# Patient Record
(demographics unavailable — no encounter records)

---

## 2024-12-12 NOTE — HISTORY OF PRESENT ILLNESS
[FreeTextEntry1] : The patient is an 85-year-old male with a history of coronary artery disease (status post CABG 2003 with LIMA to LAD and SVG to OM1; CHRISTAL to native mid-circumflex artery 5/10/2022), paroxysmal atrial fibrillation (newly-discovered 3/22/2023), ventricular ectopy (exhibited during nuclear GXT 4/8/2022), mild left ventricular diastolic dysfunction, a mildly dilated ascending aorta (4.1 cm on CT chest 12/13/2021, 4.0 cm in diameter on cardiac MRI 5/23/2023), chronic dyspnea on exertion, mild mitral regurgitation, moderate tricuspid regurgitation, moderate to severe carotid atherosclerosis, hypertension, a dyslipidemia, pre-diabetes, TYESHA, micturition syncope (2016), peptic ulcer disease (status post upper GI bleeding, for which Plavix was discontinued), a left thyroid nodule, osteoporosis, vitamin D deficiency, lumbar degenerative disc disease, prostate carcinoma (status post seed implantation 4/1999), and left inguinal hernia repair (10/5/2020).  IN early December of 2024 he presented to our office after a syncopal episode after getting out of bed.  HE came to the office, and was severely orthostatic.  He was sent to the ED.  Work up there revealed an elevated BUN/creatinine ratio, but not other abnormalities.  Flomax was held, but has been resumed.  He is now noticing that his BP is elevated.  HE was in AF at that point, and there was some thought that maintenance of sinus rhythm may be helpful.  He is back in sinus today.  He has not had any further episodes.   Other than a mostly high BP, he has no symptoms.

## 2024-12-12 NOTE — DISCUSSION/SUMMARY
[FreeTextEntry1] : This is an 85 year old man with a history as above who comes to the office for follow up after an episode of what seems to be orthostatic or vagal syncope.  He was sent to the ED, and labs indicated that he was dehydrated.  He has been trying to hydrate better, but can still drink more.  He will try.  He was in AF, but is back in NSR.  It does not seem that AF was the cause of his syncope.  His Flomax was held, but he has resumed it.  His BP is mostly high.  I am increasing Toprol to BID dosing.  He will continue the rest of his medications, and follow with DR. Moreno at the end of the month.  Last carotid Doppler and echocaridogram were unrevealing.   [EKG obtained to assist in diagnosis and management of assessed problem(s)] : EKG obtained to assist in diagnosis and management of assessed problem(s)

## 2024-12-30 NOTE — DISCUSSION/SUMMARY
[FreeTextEntry1] : Mr. Gutierrez has a history of coronary artery disease, and was detected as exhibiting newly-discovered paroxysmal atrial fibrillation with a controlled ventricular response at rest (however, with a tachycardic ventricular response upon walking to the bathroom in the emergency room) on 3/22/2023, after having presented with lightheadedness and weakness since early that morning.  Rate control therapy with metoprolol and anticoagulation with Eliquis were initiated at that time for control of the ventricular response and to reduce the risk of stroke, respectively.  The patient developed symptomatic recurrence of paroxysmal atrial fibrillation with a controlled ventricular response on 4/2/2024, with the rhythm noted to have spontaneously converted back to sinus rhythm on a 3-day Holter monitor study initiated on 4/2/2024 (AF burden 1% on this study).  He was noted to be exhibiting recurrence of paroxysmal atrial fibrillation when he presented to the office with symptomatic orthostatic hypotension on 12/3/2024.  He was noted to be exhibiting sinus rhythm at the time of a follow-up visit in our office on 12/12/2024, at which time his dosage of Toprol-XL was increased in an effort to more optimally control his blood pressure.  The patient reports having experienced a 5-minute episode of palpitations without associated symptoms approximately 1 week ago, resolving spontaneously.  He has otherwise not experienced recurrence of palpitations since his previous visit here.  He has noted a similar degree of dyspnea on exertion in association with walking 4 blocks and/or climbing more than 1 flight of stairs.  His cardiac examination today is remarkable for a regular rhythm with a normal rate.  He is not exhibiting evidence of congestive heart failure on examination today.  His blood pressure reading today is normal in a supine position (130/76), with an asymptomatic orthostatic drop upon standing (100/60).  His electrocardiogram today reveals sinus rhythm at a rate of 65 bpm, a nonspecific RSR' pattern associated with T wave inversion in leads V1-V3, and nonspecific diminished voltage in leads V5-V6, essentially unchanged from his previous office tracing, allowing for lead placement variation, with the exception of no atrial premature contractions exhibited on the present tracing.   I pointed out to the patient that he is again exhibiting an orthostatic drop in his blood pressure in the office today, however, not to a hypotensive degree, and not associated with orthostatic symptomatology.  Therefore, I have instructed him to continue metoprolol succinate 25 mg twice daily for the time being, and his blood pressure will be followed.  I encouraged the patient to maintain himself in a well-hydrated state, and to call me if he should experience orthostatic lightheadedness or presyncope.  I have reviewed the findings of the 3-day Holter monitor study of 4/2/2024 and I have again explained the implications of atrial fibrillation to the patient today, including the options of a rate-control vs. a rhythm-control strategy.  I explained to him that with either one of these strategies, anticoagulation would be recommended indefinitely to reduce the risk of stroke associated with this arrhythmia.  I explained to the patient that although his rhythm has spontaneously reverted back to sinus rhythm on the 2 occasions that paroxysmal atrial fibrillation has been documented (actually significant sinus bradycardia), it is likely that he will exhibit recurrence of paroxysmal atrial fibrillation in the future.  Therefore, I have recommended to the patient that he be maintained on anticoagulation (Eliquis 5 mg twice daily).  The options of antiarrhythmic therapy and/or an atrial fibrillation ablation procedure were discussed with the patient however, I have recommended that these rhythm-control options be reserved for intractable symptomatic atrial fibrillation.  I have reviewed the findings of the pharmacological nuclear stress study of 8/23/2024, the carotid artery Doppler study of 8/21/2024, the echocardiogram of 5/29/2024, and the cardiac MRI study of 5/23/2023 in detail with the patient today.  I have reviewed the findings of the blood test report of 9/19/2024 in detail with the patient today, and I have instructed him to continue Lipitor 20 mg daily for the time being.  He anticipates having follow-up blood testing performed to the office of his PCP in the near future, and he will have a copy of the results forwarded to my office for my review.  The importance of proper dietary habits, proper weight maintenance, and regular exercise as tolerated was discussed with the patient today.  I have asked the patient to call me if he should have any questions or problems pertaining to these matters, and especially if he should experience suspected recurrence of paroxysmal atrial fibrillation, or any other concerning symptoms.  I have otherwise asked him to return to the office for follow-up cardiac evaluation and blood pressure reassessment in 3 months, provided he remains clinically stable in the interim. [EKG obtained to assist in diagnosis and management of assessed problem(s)] : EKG obtained to assist in diagnosis and management of assessed problem(s)

## 2024-12-30 NOTE — HISTORY OF PRESENT ILLNESS
[FreeTextEntry1] : Mr. Solitario Gutierrez presented to the office today for follow-up cardiac evaluation.  He was last evaluated in our office on 12/12/2024 by Dr. Elena.  The patient is an 85-year-old male with a history of coronary artery disease (status post CABG 2003 with LIMA to LAD and SVG to OM1; CHRISTAL to native mid-circumflex artery 5/10/2022), paroxysmal atrial fibrillation (newly-discovered 3/22/2023), ventricular ectopy (exhibited during nuclear GXT 4/8/2022), mild left ventricular diastolic dysfunction, a mildly dilated ascending aorta (4.1 cm on CT chest 12/13/2021, 4.0 cm in diameter on cardiac MRI 5/23/2023), chronic dyspnea on exertion, mild mitral regurgitation, moderate tricuspid regurgitation, moderate to severe carotid atherosclerosis, hypertension, a dyslipidemia, pre-diabetes, TYESHA, micturition syncope (2016), peptic ulcer disease (status post upper GI bleeding, for which Plavix was discontinued), a left thyroid nodule, osteoporosis, vitamin D deficiency, lumbar degenerative disc disease, prostate carcinoma (status post seed implantation 4/1999), and left inguinal hernia repair (10/5/2020).  The patient was evaluated by his neurologist, Dr. Rankin, on 12/3/2024 following an episode of orthostatic syncope that morning.  He was noted to be exhibiting orthostatic hypotension at the office of the neurologist ( systolic supine, BP 90 systolic standing).  He was referred over to our office, where he was noted to be exhibiting atrial fibrillation with a controlled ventricular response and marked orthostasis (BP 78 systolic upon standing).  I referred him to the ED for further evaluation and intravenous hydration that day.  He received intravenous hydration in the ED.  His evaluation in the ED was otherwise unrevealing, and he was discharged to home from the ED after receiving IV hydration.  He followed up in our office on 12/12/2024 with Dr. Elena, at which time he was exhibiting sinus rhythm and was noted to be hypertensive, for which he was instructed to increase the dosage of Toprol-XL from 25 mg once daily to twice daily.  The patient reports having experienced a 5-minute episode of palpitations without associated symptoms approximately 1 week ago, which resolved spontaneously.  He has not experienced recurrence of palpitations since this brief episode.  He has not experienced any episodes of presyncope or syncope.  He has noted a similar degree of dyspnea on exertion if he should walk approximately 4 blocks and/or climb more than 1 flight of stairs, without any recent change in the degree or pattern of this symptom.  He has not experienced any symptoms of chest discomfort in association with his activities.  He has not noted orthopnea, paroxysmal nocturnal dyspnea, or lower extremity edema.  Review of systems is significant for the patient having been experiencing disequilibrium and unsteady gait at times, for which he consulted with a neurologist on 1/22/2024.  He was referred for an MRI scan of the brain, which was performed on 1/23/2024.  This study revealed mild chronic small ischemic changes and multiple tiny cerebellar lacunar infarcts.  He tested positive for COVID-19 viral infection on 12/21/2024.  He is presently being treated with an antibiotic for a sinus infection.  As far as risk factors for coronary artery disease are concerned, the patient has a history of hypertension, a dyslipidemia, and pre-diabetes.  He reports having discontinued cigarette smoking 53 years ago, after having smoked 1 pack/day for a period of approximately 5 years.  He does not have a known immediate family history of premature coronary artery disease.  Laboratory studies performed on 9/19/2024 (on Lipitor 20 mg daily) revealed cholesterol 106, triglycerides 62, HDL 38, and calculated LDL 56.  The liver chemistries were normal.  The BUN and creatinine were 19 and 1.1, respectively.  The potassium level was 4.2.  The glucose level was 107.  The hemoglobin and hematocrit were 13.5 and 41.7, respectively.  A 3-day Holter monitor study initiated on 4/2/2024 revealed the predominant rhythm to be sinus rhythm at an average rate of 57 bpm (range 41 to 89 bpm) with an atrial fibrillation burden of 1% (average ventricular rate 114 bpm).  17 episodes of non-sustained SVT were exhibited (longest 16 beats).  A 5 beat episode of NSVT was exhibited.  Occasional supraventricular premature contractions and rare ventricular premature contractions were exhibited.  Coronary angiography performed on 5/10/2022 for further evaluation of dyspnea on exertion revealed the left main coronary artery to be angiographically normal.  The left anterior descending artery was totally occluded in the proximal portion.  A 90% stenosis was noted involving the mid-portion of the circumflex artery.  The right coronary artery was angiographically normal.  The pulmonary artery pressures were normal.  A patent LIMA graft was noted to the LAD.  An occluded saphenous vein graft was noted to the first obtuse marginal branch of the circumflex artery.  At the time of this procedure, a successful stenting procedure (CHRISTAL) was performed on the tight stenosis involving the mid-portion of the circumflex artery.  Pharmacological nuclear stress testing performed on 8/23/2024 was negative for the inducement of cardiac symptoms or electrocardiographic evidence of myocardial ischemia.  Rare ventricular premature contractions were exhibited throughout the study.  The cardiac imaging portion of the study revealed normal left ventricular myocardial perfusion and systolic function, with a calculated ejection fraction of 62%.  Echocardiography most recently performed on 5/29/2024 revealed the right atrium and right ventricle to be mildly dilated, with normal right ventricular systolic function.  The left-sided cardiac chambers were normal in dimension.  Left ventricular wall thickness was normal.  Septal motion was abnormal, compatible with previous cardiac surgery.  Overall left ventricular systolic function was preserved, with an estimated ejection fraction of 55 to 60%.  Mild left ventricular diastolic dysfunction was demonstrated.  The ascending aorta was noted to be mildly dilated, measuring 3.9 cm in diameter.  Mild mitral regurgitation and moderate tricuspid regurgitation were demonstrated, without evidence of pulmonary hypertension.  Cardiac MRI evaluation performed on 5/23/2023 to better assess the right ventricle revealed mild bi-atrial enlargement.  The ventricles were normal in dimension.  Right ventricular systolic function was normal, with an estimated ejection fraction of 65%.  Left ventricular systolic function was normal with an estimated ejection fraction of 60%.  No resting perfusion defects were identified.  A jet of tricuspid insufficiency was reported.  The ascending aorta was noted to be mildly dilated, measuring 4.0 cm in diameter.  Carotid artery Doppler testing most recently performed on 8/21/2024 revealed moderate plaque formation involving the bulbar regions and the internal carotid arteries bilaterally.  No significant stenoses were demonstrated.  A left-sided thyroid nodule was again noted.  Previous History:  The patient noted feeling lightheaded when he got up from bed at approximately 3:00 AM on 3/22/2023 to go to the bathroom to urinate.  He then went grocery shopping at approximately 6:00 AM that morning, at which time he also felt lightheaded.  When he got home, the symptoms persisted, and eventually, his wife checked his pulse, and noted it to be irregular, for which the patient was evaluated in the emergency department at [facility].  He was noted to be exhibiting newly-discovered atrial fibrillation with an average ventricular rate of 80 bpm at rest, increasing to approximately 150 bpm while walking to the bathroom in the emergency department.  Metoprolol tartrate 25 mg twice daily was prescribed for rate control and Eliquis 5 mg twice daily was prescribed to reduce the risk of stroke associated with atrial fibrillation.  The patient was discharged from the hospital on 3/23/2023.  The patient presented urgently to the office on 4/2/2024 with suspected recurrence of paroxysmal atrial fibrillation, noting that he developed palpitations and lightheadedness that morning (he recognized the symptoms as being similar to symptoms he previously experienced in association with paroxysmal atrial fibrillation when it was initially discovered on 3/22/2023).  The ventricular response was controlled.  The patient was referred for a 3-day Holter monitor study, which was initiated on 4/2/2024.  This study revealed the predominant rhythm to be sinus rhythm at an average rate of 57 bpm (range 41 to 89 bpm) with an atrial fibrillation burden of 1% (average ventricular rate 114 bpm).  17 episodes of non-sustained SVT were exhibited (longest 16 beats).  A 5 beat episode of NSVT was exhibited.  Occasional supraventricular premature contractions and rare ventricular premature contractions were exhibited.

## 2024-12-30 NOTE — CARDIOLOGY SUMMARY
[de-identified] : 12/30/24 -sinus rhythm at a rate of 65 bpm.  Nonspecific poor R wave progression with T wave inversion in leads V1-V3.

## 2024-12-30 NOTE — PHYSICAL EXAM
[Well Developed] : well developed [No Acute Distress] : no acute distress [Normal Conjunctiva] : normal conjunctiva [No Carotid Bruit] : no carotid bruit [Normal S1, S2] : normal S1, S2 [No Rub] : no rub [Clear Lung Fields] : clear lung fields [Soft] : abdomen soft [Non Tender] : non-tender [Normal Gait] : normal gait [No Edema] : no edema [No Rash] : no rash [Moves all extremities] : moves all extremities [de-identified] : No JVD is appreciated at a 45 degree angle

## 2025-04-02 NOTE — DISCUSSION/SUMMARY
[FreeTextEntry1] : Mr. Gutierrez has a history of coronary artery disease, and was detected as exhibiting newly-discovered paroxysmal atrial fibrillation with a controlled ventricular response at rest (however, with a tachycardic ventricular response upon walking to the bathroom in the emergency room) on 3/22/2023, after having presented with lightheadedness and weakness since early that morning.  Rate control therapy with metoprolol and anticoagulation with Eliquis were initiated at that time for control of the ventricular response and to reduce the risk of stroke, respectively.  The patient developed symptomatic recurrence of paroxysmal atrial fibrillation with a controlled ventricular response on 4/2/2024, with the rhythm noted to have spontaneously converted back to sinus rhythm on a 3-day Holter monitor study initiated on 4/2/2024 (AF burden 1% on this study).  He was noted to be exhibiting recurrence of paroxysmal atrial fibrillation when he presented to the office with symptomatic orthostatic hypotension on 12/3/2024.  He was noted to be exhibiting sinus rhythm at the time of a follow-up visit in our office on 12/12/2024, at which time his dosage of Toprol-XL was increased in an effort to more optimally control his blood pressure.  The patient r has been stable from a cardiac symptomatic standpoint since his previous visit here on 12/30/2024.  Specifically, he has noted a similar degree of dyspnea on exertion in association with walking 4 blocks and/or climbing more than 1 flight of stairs.  His cardiac examination today is remarkable for a regular rhythm with a bradycardic rate.  He is not exhibiting evidence of congestive heart failure on examination today.  His blood pressure reading today is mildly elevated.  His electrocardiogram today reveals sinus bradycardia at a rate of 52 bpm, a nonspecific RSR' pattern associated with poor R wave progression and T wave inversion in leads V1-V2, essentially unchanged from his previous office tracing, allowing for lead placement variation.   Although the patient is exhibiting a mildly elevated blood pressure reading in the office today, it should be noted that his systolic blood pressure diminished by 26 mmHg following this examination, suggesting a reactive component.  In addition, he has previously exhibited orthostatic hypotension.  I have instructed him to continue metoprolol succinate 25 mg twice daily for the time being, and his blood pressure will be followed.  The importance of following a sodium-restricted diet was emphasized to the patient today.  In addition, I encouraged the patient to maintain himself in a well-hydrated state, and to call me if he should experience orthostatic lightheadedness or presyncope.  I have reviewed the findings of the 3-day Holter monitor study of 4/2/2024 and I have again explained the implications of atrial fibrillation to the patient today, including the options of a rate-control vs. a rhythm-control strategy.  I explained to him that with either one of these strategies, anticoagulation would be recommended indefinitely to reduce the risk of stroke associated with this arrhythmia.  I explained to the patient that although his rhythm has spontaneously reverted back to sinus rhythm on the 2 occasions that paroxysmal atrial fibrillation has been documented (actually significant sinus bradycardia), it is likely that he will exhibit recurrence of paroxysmal atrial fibrillation in the future.  Therefore, I have recommended to the patient that he be maintained on anticoagulation (Eliquis 5 mg twice daily).  The options of antiarrhythmic therapy and/or an atrial fibrillation ablation procedure were discussed with the patient however, I have recommended that these rhythm-control options be reserved for intractable symptomatic atrial fibrillation.  I have reviewed the findings of the pharmacological nuclear stress study of 8/23/2024, the carotid artery Doppler study of 8/21/2024, the echocardiogram of 5/29/2024, and the cardiac MRI study of 5/23/2023 in detail with the patient today.  I have reviewed the findings of the blood test report of 9/19/2024 in detail with the patient today, and I have instructed him to continue Lipitor 20 mg daily for the time being.  He anticipates having follow-up blood testing performed to the office of his PCP in the near future, and he will have a copy of the results forwarded to my office for my review.  The importance of proper dietary habits, proper weight maintenance, and regular exercise as tolerated was discussed with the patient today.  I have asked the patient to call me if he should have any questions or problems pertaining to these matters, and especially if he should experience suspected recurrence of paroxysmal atrial fibrillation, or any other concerning symptoms.  I have otherwise asked him to return to the office for follow-up cardiac evaluation and blood pressure reassessment in 2 months, provided he remains clinically stable in the interim. [EKG obtained to assist in diagnosis and management of assessed problem(s)] : EKG obtained to assist in diagnosis and management of assessed problem(s)

## 2025-04-02 NOTE — CARDIOLOGY SUMMARY
[de-identified] : 4/2/25 -sinus bradycardia at a rate of 52 bpm.  Nonspecific RSR' pattern with poor R wave progression and T wave inversion in leads V1-V2.

## 2025-04-02 NOTE — PHYSICAL EXAM
[Well Developed] : well developed [No Acute Distress] : no acute distress [Normal Conjunctiva] : normal conjunctiva [No Carotid Bruit] : no carotid bruit [Normal S1, S2] : normal S1, S2 [No Rub] : no rub [Clear Lung Fields] : clear lung fields [Soft] : abdomen soft [Non Tender] : non-tender [Normal Gait] : normal gait [No Edema] : no edema [No Rash] : no rash [Moves all extremities] : moves all extremities [de-identified] : No JVD is appreciated at a 45 degree angle

## 2025-06-10 NOTE — PHYSICAL EXAM
[Well Developed] : well developed [No Acute Distress] : no acute distress [Normal Conjunctiva] : normal conjunctiva [No Carotid Bruit] : no carotid bruit [Normal S1, S2] : normal S1, S2 [No Rub] : no rub [Clear Lung Fields] : clear lung fields [Soft] : abdomen soft [Non Tender] : non-tender [Normal Gait] : normal gait [No Edema] : no edema [No Rash] : no rash [Moves all extremities] : moves all extremities [de-identified] : No JVD is appreciated at a 45 degree angle

## 2025-06-10 NOTE — CARDIOLOGY SUMMARY
[de-identified] : 6/10/25 -sinus arrhythmia at an average rate of 70 bpm.  Nonspecific RSR' pattern in leads V1-V2.  Nonspecific repolarization abnormalities.

## 2025-06-10 NOTE — DISCUSSION/SUMMARY
[EKG obtained to assist in diagnosis and management of assessed problem(s)] : EKG obtained to assist in diagnosis and management of assessed problem(s) [FreeTextEntry1] : Mr. Gutierrez has a history of coronary artery disease, and was detected as exhibiting newly-discovered paroxysmal atrial fibrillation with a controlled ventricular response at rest (however, with a tachycardic ventricular response upon walking to the bathroom in the emergency room) on 3/22/2023, after having presented with lightheadedness and weakness since early that morning.  Rate control therapy with metoprolol and anticoagulation with Eliquis were initiated at that time for control of the ventricular response and to reduce the risk of stroke, respectively.  The patient developed symptomatic recurrence of paroxysmal atrial fibrillation with a controlled ventricular response on 4/2/2024, with the rhythm noted to have spontaneously converted back to sinus rhythm on a 3-day Holter monitor study initiated on 4/2/2024 (AF burden 1% on this study).  He was noted to be exhibiting recurrence of paroxysmal atrial fibrillation when he presented to the office with symptomatic orthostatic hypotension on 12/3/2024.  He was noted to be exhibiting sinus rhythm at the time of a follow-up visit in our office on 12/12/2024, at which time his dosage of Toprol-XL was increased in an effort to more optimally control his blood pressure.  The dosage of Toprol-XL was reduced back down to 25 mg once daily after the patient experienced a brief episode of orthostatic syncope in early May 2025.  The patient has otherwise been stable from a cardiac symptomatic standpoint since his previous visit here on 4/2/2025.  Specifically, he has noted a similar degree of dyspnea on exertion in association with walking 4 blocks and/or climbing more than 1 flight of stairs.  His cardiac examination today is remarkable for a regular rhythm with a bradycardic rate.  He is not exhibiting evidence of congestive heart failure on examination today.  His blood pressure reading today is normal.  His electrocardiogram today reveals sinus arrhythmia at an average rate of 70 bpm, a nonspecific RSR' pattern associated with poor R wave progression and T wave inversion in leads V1-V2, essentially unchanged from his previous office tracing, allowing for lead placement variation, with the exception of regular sinus rhythm having been exhibited on the previous tracing.   As the patient is exhibiting a normal blood pressure reading today, and in view of the brief episode of orthostatic syncope he experienced in early May 2025, I have instructed him to continue metoprolol succinate 25 mg once daily for the time being, and his blood pressure will be followed.  I have encouraged the patient to maintain himself in a well-hydrated state, and to call me if he should experience orthostatic lightheadedness or presyncope.  I have reviewed the findings of the 3-day Holter monitor study of 4/2/2024 and I have again explained the implications of atrial fibrillation to the patient today, including the options of a rate-control vs. a rhythm-control strategy.  I explained to him that with either one of these strategies, anticoagulation would be recommended indefinitely to reduce the risk of stroke associated with this arrhythmia.  I explained to the patient that although his rhythm has spontaneously reverted back to sinus rhythm on the 2 occasions that paroxysmal atrial fibrillation has been documented (actually significant sinus bradycardia), it is likely that he will exhibit recurrence of paroxysmal atrial fibrillation in the future.  Therefore, I have recommended to the patient that he be maintained on anticoagulation (Eliquis 5 mg twice daily).  The options of antiarrhythmic therapy and/or an atrial fibrillation ablation procedure were discussed with the patient however, I have recommended that these rhythm-control options be reserved for intractable symptomatic atrial fibrillation.  I have reviewed the findings of the pharmacological nuclear stress study of 8/23/2024, the carotid artery Doppler study of 8/21/2024, the echocardiogram of 5/29/2024, and the cardiac MRI study of 5/23/2023 in detail with the patient today.  I have reviewed the findings of the blood test report of 4/23/2025 in detail with the patient today, and I have instructed him to continue Lipitor 20 mg daily for the time being.  The importance of proper dietary habits, proper weight maintenance, and regular exercise as tolerated was discussed with the patient today.  I have asked the patient to call me if he should have any questions or problems pertaining to these matters, and especially if he should experience suspected recurrence of paroxysmal atrial fibrillation, or any other concerning symptoms.  I have otherwise asked him to return to the office for follow-up cardiac evaluation and blood pressure reassessment in 4 months, provided he remains clinically stable in the interim.  Follow-up echocardiography will be arranged at that time for reassessment.

## 2025-06-10 NOTE — HISTORY OF PRESENT ILLNESS
[FreeTextEntry1] : Mr. Solitario Gutierrez presented to the office today for follow-up cardiac evaluation.  I last evaluated the patient in the office on 4/2/2025.  The patient is an 86-year-old male with a history of coronary artery disease (status post CABG 2003 with LIMA to LAD and SVG to OM1; CHRISTAL to native mid-circumflex artery 5/10/2022), paroxysmal atrial fibrillation (newly-discovered 3/22/2023), ventricular ectopy (exhibited during nuclear GXT 4/8/2022), mild left ventricular diastolic dysfunction, a mildly dilated ascending aorta (4.1 cm on CT chest 12/13/2021, 4.0 cm in diameter on cardiac MRI 5/23/2023), chronic dyspnea on exertion, mild mitral regurgitation, moderate tricuspid regurgitation, moderate to severe carotid atherosclerosis, hypertension, a dyslipidemia, pre-diabetes, TYESHA, micturition syncope (2016), peptic ulcer disease (status post upper GI bleeding, for which Plavix was discontinued), a left thyroid nodule, osteoporosis, vitamin D deficiency, lumbar degenerative disc disease, prostate carcinoma (status post seed implantation 4/1999), and left inguinal hernia repair (10/5/2020).  In early May 2025, the patient reports that while he was standing and urinating, he began to feel "strange".  He turned around to sit down on the toilet, however, he fell to the ground, seemingly having momentarily experienced syncope.  He followed up with his PCP, Dr. Love, who advised the patient to reduce the dosage of Toprol-XL from 25 mg twice daily to once daily.  He has not experienced recurrence of presyncope or syncope since this particular episode.  The patient has otherwise been stable from a cardiac symptomatic standpoint since his previous visit here on 4/2/2025.  Specifically, he has noted a similar degree of dyspnea on exertion if he should walk approximately 4 blocks and/or climb more than 1 flight of stairs, without any recent change in the degree or pattern of this symptom.  He has not experienced any symptoms of chest discomfort in association with his activities.  He has not noted orthopnea, paroxysmal nocturnal dyspnea, or lower extremity edema.  He reports having experienced 1 "slight" episode of palpitations since his previous visit here on 4/2/2025, describing a sensation of his heart "pounding" very briefly without associated symptoms.  The patient was evaluated by his neurologist, Dr. Rankin, on 12/3/2024 following an episode of orthostatic syncope that morning.  He was noted to be exhibiting orthostatic hypotension at the office of the neurologist ( systolic supine, BP 90 systolic standing).  He was referred over to our office, where he was noted to be exhibiting atrial fibrillation with a controlled ventricular response and marked orthostasis (BP 78 systolic upon standing).  I referred him to the ED for further evaluation and intravenous hydration that day.  He received intravenous hydration in the ED.  His evaluation in the ED was otherwise unrevealing, and he was discharged to home from the ED after receiving IV hydration.  He followed up in our office on 12/12/2024 with Dr. Elena, at which time he was exhibiting sinus rhythm and was noted to be hypertensive, for which he was instructed to increase the dosage of Toprol-XL from 25 mg once daily to twice daily.  Review of systems is significant for the patient having been experiencing disequilibrium and unsteady gait at times, for which he consulted with a neurologist on 1/22/2024.  He was referred for an MRI scan of the brain, which was performed on 1/23/2024.  This study revealed mild chronic small ischemic changes and multiple tiny cerebellar lacunar infarcts.  He reports having recently been diagnosed as having biopsy-proven psoriasis, for which he is presently being treated with tapinarof 1% cream.  As far as risk factors for coronary artery disease are concerned, the patient has a history of hypertension, a dyslipidemia, and pre-diabetes.  He reports having discontinued cigarette smoking >50 years ago, after having smoked 1 pack/day for a period of approximately 5 years.  He does not have a known immediate family history of premature coronary artery disease.  Laboratory studies performed on 4/23/2025 (on Lipitor 20 mg daily) revealed cholesterol 109, triglycerides 58, HDL 41, and calculated LDL 55.  The liver chemistries were normal.  The BUN and creatinine were 19 and 0.84, respectively.  The potassium level was 3.8.  The glucose level was 110 and the hemoglobin A1c level was 5.9%.  The hemoglobin and hematocrit were 13.1 and 41.4, respectively.  The TSH level was 1.29.  A 3-day Holter monitor study initiated on 4/2/2024 revealed the predominant rhythm to be sinus rhythm at an average rate of 57 bpm (range 41 to 89 bpm) with an atrial fibrillation burden of 1% (average ventricular rate 114 bpm).  17 episodes of non-sustained SVT were exhibited (longest 16 beats).  A 5 beat episode of NSVT was exhibited.  Occasional supraventricular premature contractions and rare ventricular premature contractions were exhibited.  Coronary angiography performed on 5/10/2022 for further evaluation of dyspnea on exertion revealed the left main coronary artery to be angiographically normal.  The left anterior descending artery was totally occluded in the proximal portion.  A 90% stenosis was noted involving the mid-portion of the circumflex artery.  The right coronary artery was angiographically normal.  The pulmonary artery pressures were normal.  A patent LIMA graft was noted to the LAD.  An occluded saphenous vein graft was noted to the first obtuse marginal branch of the circumflex artery.  At the time of this procedure, a successful stenting procedure (CHRISTAL) was performed on the tight stenosis involving the mid-portion of the circumflex artery.  Pharmacological nuclear stress testing performed on 8/23/2024 was negative for the inducement of cardiac symptoms or electrocardiographic evidence of myocardial ischemia.  Rare ventricular premature contractions were exhibited throughout the study.  The cardiac imaging portion of the study revealed normal left ventricular myocardial perfusion and systolic function, with a calculated ejection fraction of 62%.  Echocardiography most recently performed on 5/29/2024 revealed the right atrium and right ventricle to be mildly dilated, with normal right ventricular systolic function.  The left-sided cardiac chambers were normal in dimension.  Left ventricular wall thickness was normal.  Septal motion was abnormal, compatible with previous cardiac surgery.  Overall left ventricular systolic function was preserved, with an estimated ejection fraction of 55 to 60%.  Mild left ventricular diastolic dysfunction was demonstrated.  The ascending aorta was noted to be mildly dilated, measuring 3.9 cm in diameter.  Mild mitral regurgitation and moderate tricuspid regurgitation were demonstrated, without evidence of pulmonary hypertension.  Cardiac MRI evaluation performed on 5/23/2023 to better assess the right ventricle revealed mild bi-atrial enlargement.  The ventricles were normal in dimension.  Right ventricular systolic function was normal, with an estimated ejection fraction of 65%.  Left ventricular systolic function was normal with an estimated ejection fraction of 60%.  No resting perfusion defects were identified.  A jet of tricuspid insufficiency was reported.  The ascending aorta was noted to be mildly dilated, measuring 4.0 cm in diameter.  Carotid artery Doppler testing most recently performed on 8/21/2024 revealed moderate plaque formation involving the bulbar regions and the internal carotid arteries bilaterally.  No significant stenoses were demonstrated.  A left-sided thyroid nodule was again noted.  Previous History:  The patient noted feeling lightheaded when he got up from bed at approximately 3:00 AM on 3/22/2023 to go to the bathroom to urinate.  He then went grocery shopping at approximately 6:00 AM that morning, at which time he also felt lightheaded.  When he got home, the symptoms persisted, and eventually, his wife checked his pulse, and noted it to be irregular, for which the patient was evaluated in the emergency department at [facility].  He was noted to be exhibiting newly-discovered atrial fibrillation with an average ventricular rate of 80 bpm at rest, increasing to approximately 150 bpm while walking to the bathroom in the emergency department.  Metoprolol tartrate 25 mg twice daily was prescribed for rate control and Eliquis 5 mg twice daily was prescribed to reduce the risk of stroke associated with atrial fibrillation.  The patient was discharged from the hospital on 3/23/2023.  The patient presented urgently to the office on 4/2/2024 with suspected recurrence of paroxysmal atrial fibrillation, noting that he developed palpitations and lightheadedness that morning (he recognized the symptoms as being similar to symptoms he previously experienced in association with paroxysmal atrial fibrillation when it was initially discovered on 3/22/2023).  The ventricular response was controlled.  The patient was referred for a 3-day Holter monitor study, which was initiated on 4/2/2024.  This study revealed the predominant rhythm to be sinus rhythm at an average rate of 57 bpm (range 41 to 89 bpm) with an atrial fibrillation burden of 1% (average ventricular rate 114 bpm).  17 episodes of non-sustained SVT were exhibited (longest 16 beats).  A 5 beat episode of NSVT was exhibited.  Occasional supraventricular premature contractions and rare ventricular premature contractions were exhibited.